# Patient Record
Sex: MALE | Race: WHITE | Employment: FULL TIME | URBAN - METROPOLITAN AREA
[De-identification: names, ages, dates, MRNs, and addresses within clinical notes are randomized per-mention and may not be internally consistent; named-entity substitution may affect disease eponyms.]

---

## 2022-11-30 ENCOUNTER — HOSPITAL ENCOUNTER (EMERGENCY)
Age: 47
Discharge: HOME | End: 2022-11-30
Attending: EMERGENCY MEDICINE
Payer: COMMERCIAL

## 2022-11-30 VITALS
HEART RATE: 98 BPM | RESPIRATION RATE: 20 BRPM | OXYGEN SATURATION: 99 % | WEIGHT: 200 LBS | DIASTOLIC BLOOD PRESSURE: 86 MMHG | SYSTOLIC BLOOD PRESSURE: 170 MMHG

## 2022-11-30 DIAGNOSIS — S63.91XA SPRAIN OF RIGHT HAND, INITIAL ENCOUNTER: Primary | ICD-10-CM

## 2022-11-30 PROCEDURE — 99283 EMERGENCY DEPT VISIT LOW MDM: CPT

## 2022-11-30 ASSESSMENT — ENCOUNTER SYMPTOMS
CHEST TIGHTNESS: 0
WHEEZING: 0
SORE THROAT: 0
DIARRHEA: 0
SHORTNESS OF BREATH: 0
COUGH: 0
VOMITING: 0
ABDOMINAL PAIN: 0
BACK PAIN: 0
NAUSEA: 0

## 2022-11-30 NOTE — ED PROVIDER NOTES
Chief complaint:  Right hand pain    HPI history provided by the patient  Patient comes in by ambulance complaining of an episode of right hand pain today while at work. He apparently was picking a piece of wood out that he typically picks up all the time in the palm aspect of his right hand over the fourth and fifth digits hurt him causing a quick sharp tingling sensation through there and now feels a little swollen although has no particular pain. No blunt injuries. No deformities. No redness. No fevers. No lacerations or puncture wounds. No treatment prior to arrival.  Symptoms improving already. Review of Systems   Constitutional:  Negative for chills, diaphoresis, fatigue and fever. HENT:  Negative for congestion and sore throat. Respiratory:  Negative for cough, chest tightness, shortness of breath and wheezing. Cardiovascular:  Negative for chest pain and palpitations. Gastrointestinal:  Negative for abdominal pain, diarrhea, nausea and vomiting. Genitourinary:  Negative for dysuria and flank pain. Musculoskeletal:  Negative for arthralgias, back pain, gait problem, neck pain and neck stiffness. Skin:  Negative for rash and wound. Neurological:  Negative for weakness, numbness and headaches. All other systems reviewed and are negative. Physical Exam  Vitals and nursing note reviewed. Constitutional:       General: He is awake. He is not in acute distress. Appearance: He is well-developed. He is not ill-appearing, toxic-appearing or diaphoretic. HENT:      Head: Normocephalic and atraumatic. Comments: No sign of acute head or face injuries  Eyes:      Pupils: Pupils are equal, round, and reactive to light. Cardiovascular:      Rate and Rhythm: Normal rate and regular rhythm. Heart sounds: Normal heart sounds. No murmur heard. Pulmonary:      Effort: Pulmonary effort is normal. No respiratory distress. Breath sounds: Normal breath sounds.  No stridor, decreased air movement or transmitted upper airway sounds. No decreased breath sounds, wheezing, rhonchi or rales. Abdominal:      General: Bowel sounds are normal.      Palpations: Abdomen is soft. Tenderness: There is no abdominal tenderness. There is no guarding or rebound. Musculoskeletal:      Cervical back: Full passive range of motion without pain, normal range of motion and neck supple. No signs of trauma or rigidity. Normal range of motion. Comments: Right hand with intact radial, ulnar and median nerves, no swelling, no limited range of motion of the right wrist.  Normal tracking and almost complete full range of motion to flexion of all 5 digits, slightly weak at the end of gripping otherwise good flexion throughout all 5 digits with intact equal extension. He is able to abduct and adduct all 5 digits, able to touch all 5 digits with the thumb, has no tenderness throughout palpation of the flexors of the digits of the right hand, no extensor tendon tenderness on palpation to the right hand. Cap refill less than 2 seconds all 5 digits with sensation grossly and equally intact. Strong radial and ulnar pulses in the right wrist.  The right hand is actually an unremarkable exam with no sign of acute injury at this time. No bony tenderness throughout palpation. Arms legs otherwise are examined, palpated throughout their entirety and have no sign of acute bony or joint injuries. No cervical, thoracic or lumbar spine tenderness. Skin:     General: Skin is warm and dry. Coloration: Skin is not cyanotic, jaundiced, mottled or pale. Neurological:      General: No focal deficit present. Mental Status: He is alert and oriented to person, place, and time. GCS: GCS eye subscore is 4. GCS verbal subscore is 5. GCS motor subscore is 6. Cranial Nerves: No cranial nerve deficit. Coordination: Coordination normal.   Psychiatric:         Behavior: Behavior is cooperative. Procedures     MDM  Patient presents here for right hand pain while working, developed right palmar pain over the fourth and fifth digits when picking up a piece of wood that he picks up daily, as he picked it up he states he had a quick pain and sharp tingly pain into the palm which is improving at this time. Physical exam is unremarkable and reassuring please see above. Work-up considerations for hand sprain, muscle spasm, muscle strain, tendon strain. No indication on history for blunt injury or bony injury or fractures. No historical suggestion of infection. No lacerations. No need for imaging at this time. Based on history and work-up, considerations most likely a strain of the muscles or tendons of the right hand. This is discussed with the patient as well as close outpatient follow-up.              --------------------------------------------- PAST HISTORY ---------------------------------------------  Past Medical History:  has no past medical history on file. Past Surgical History:  has no past surgical history on file. Social History:      Family History: family history is not on file. The patients home medications have been reviewed. Allergies: Keflex [cephalexin] and Penicillin v    -------------------------------------------------- RESULTS -------------------------------------------------  Labs:  No results found for this visit on 11/30/22. Radiology:  No orders to display       ------------------------- NURSING NOTES AND VITALS REVIEWED ---------------------------  Date / Time Roomed:  No admission date for patient encounter. ED Bed Assignment:  Room/bed info not found    The nursing notes within the ED encounter and vital signs as below have been reviewed.    BP (!) 170/86   Pulse 98   Resp 20   Wt 200 lb (90.7 kg)   SpO2 99%   Oxygen Saturation Interpretation: Normal      ------------------------------------------ PROGRESS NOTES ------------------------------------------  I have spoken with the patient and discussed todays results, in addition to providing specific details for the plan of care and counseling regarding the diagnosis and prognosis. Their questions are answered at this time and they are agreeable with the plan. I discussed at length with them reasons for immediate return here for re evaluation. They will followup with primary care by calling their office tomorrow. --------------------------------- ADDITIONAL PROVIDER NOTES ---------------------------------  At this time the patient is without objective evidence of an acute process requiring hospitalization or inpatient management. They have remained hemodynamically stable throughout their entire ED visit and are stable for discharge with outpatient follow-up. The plan has been discussed in detail and they are aware of the specific conditions for emergent return, as well as the importance of follow-up. New Prescriptions    No medications on file       Diagnosis:  1. Sprain of right hand, initial encounter        Disposition:  Patient's disposition: Discharge to home  Patient's condition is stable.          Jerardo Laboy DO  11/30/22 1830

## 2022-12-13 DIAGNOSIS — M79.641 RIGHT HAND PAIN: Primary | ICD-10-CM

## 2022-12-15 ENCOUNTER — OFFICE VISIT (OUTPATIENT)
Dept: ORTHOPEDIC SURGERY | Age: 47
End: 2022-12-15

## 2022-12-15 VITALS — HEIGHT: 70 IN | WEIGHT: 200 LBS | BODY MASS INDEX: 28.63 KG/M2 | TEMPERATURE: 98 F

## 2022-12-15 DIAGNOSIS — S63.91XA HAND SPRAIN, RIGHT, INITIAL ENCOUNTER: Primary | ICD-10-CM

## 2022-12-15 RX ORDER — OMEPRAZOLE 40 MG/1
CAPSULE, DELAYED RELEASE ORAL
COMMUNITY
Start: 2022-12-14

## 2022-12-15 RX ORDER — CELECOXIB 200 MG/1
200 CAPSULE ORAL DAILY
Qty: 30 CAPSULE | Refills: 3 | Status: SHIPPED | OUTPATIENT
Start: 2022-12-15 | End: 2023-04-14

## 2022-12-15 RX ORDER — METHYLPREDNISOLONE 4 MG/1
4 TABLET ORAL SEE ADMIN INSTRUCTIONS
Qty: 1 KIT | Refills: 0 | Status: SHIPPED | OUTPATIENT
Start: 2022-12-15 | End: 2022-12-21

## 2022-12-15 NOTE — PROGRESS NOTES
Chief Complaint   Patient presents with    Hand Pain     Right Hand, injured at work on 11/30/2022 while turing pieces of wood, went to Urgent Care and has seen [facility] twice. States of pain when closing his hand. Mindy Leonard is a 49y.o. year old  male who presents for evaluation of right hand pain. he reports this started 11/30/22 when he was grabbing a piece of wood. he does remember a specific injury that started the pain. The injury was direct trauma. His pain is located mainly in the palm of the hand. The pain is worse with activity and better with rest.  He has been seen by [facility]. He is right hand dominant and the patient is a  at [employer]. He has been on light duty since the injury. No past medical history on file. No past surgical history on file.     Current Outpatient Medications:     omeprazole (PRILOSEC) 40 MG delayed release capsule, , Disp: , Rfl:     celecoxib (CELEBREX) 200 MG capsule, Take 1 capsule by mouth daily, Disp: 30 capsule, Rfl: 3    methylPREDNISolone (MEDROL, LILLIAM,) 4 MG tablet, Take 1 tablet by mouth See Admin Instructions for 6 days Take by mouth., Disp: 1 kit, Rfl: 0  Allergies   Allergen Reactions    Keflex [Cephalexin]     Lactose     Penicillin V      Social History     Socioeconomic History    Marital status:      Spouse name: Not on file    Number of children: Not on file    Years of education: Not on file    Highest education level: Not on file   Occupational History    Not on file   Tobacco Use    Smoking status: Not on file    Smokeless tobacco: Not on file   Substance and Sexual Activity    Alcohol use: Not on file    Drug use: Not on file    Sexual activity: Not on file   Other Topics Concern    Not on file   Social History Narrative    Not on file     Social Determinants of Health     Financial Resource Strain: Not on file   Food Insecurity: Not on file   Transportation Needs: Not on file   Physical Activity: Not on file   Stress: Not on file   Social Connections: Not on file   Intimate Partner Violence: Not on file   Housing Stability: Not on file     No family history on file. REVIEW OF SYSTEMS:     General/Constitution:  (-)weight loss, (-)fever, (-)chills, (-)weakness. Skin: (-) rash,(-) psoriasis,(-) eczema, (-)skin cancer. Musculoskeletal: (-) fractures,  (-) dislocations,(-) collagen vascular disease, (-) fibromyalgia, (-) multiple sclerosis, (-) muscular dystrophy, (-) RSD,(-) joint pain (-)swelling, (-) joint pain,swelling. Neurologic: (-) epilepsy, (-)seizures,(-) brain tumor,(-) TIA, (-)stroke, (-)headaches, (-)Parkinson disease,(-) memory loss, (-) LOC. Cardiovascular: (-) Chest pain, (-) swelling in legs/feet, (-) SOB, (-) cramping in legs/feet with walking. Respiratory: (-) SOB, (-) Coughing, (-) night sweats. GI: (-) nausea, (-) vomiting, (-) diarrhea, (-) blood in stool, (-) gastric ulcer. Psychiatric: (-) Depression, (-) Anxiety, (-) bipolar disease, (-) Alzheimer's Disease  Allergic/Immunologic: (-) allergies latex, (-) allergies metal, (-) skin sensitivity. Hematlogic: (-) anemia, (-) blood transfusion, (-) DVT/PE, (-) Clotting disorders    SUBJECTIVE:    Constitution:    Temp 98 °F (36.7 °C)   Ht 5' 10\" (1.778 m)   Wt 200 lb (90.7 kg)   BMI 28.70 kg/m²     Psycihatric:  The patient is alert and oriented x 3, appears to be stated age and in no distress. Respiratory:  ReSpiratory effort is not labored. Patient is not gasping. Palpation of the chest reveals no tactile fremitus. Skin:  Upon inspection: the skin appears warm, dry and intact. There is not a previous scar over the affected area. There is not any cellulitis, lymphedema or cutaneous lesions noted in the lower extremities. Upon palpation there is no induration noted. Neurologic:    Gait: normal;  Motor exam of the upper extremities show: The reflexes in biceps/triceps/brachioradialis are equal and symmetric.   Sensory exam C5-T1 are normal bilaterally. Cardiovascular: The vascular exam is normal and is well perfused to distal extremities. There are 2+ radial pulses bilaterally, and motor and sensation is intact to median, ulnar, and radial, musclocutaneus, and axillary nerve distribution and grossly symmetric bilaterally. There is cap refill noted less than two seconds in all digits. There is not edema of the bilateral lower extremities. There is not varicosities noted in the distal extremities. Lymph:  Upon palpation,  there is no lymphadenopathy noted in bilateral lower extremities. Musculoskeletal:  Gait: normal; examination of the nails and digits reveal no cyanosis or clubbing. Cervical Exam:  On physical exam, Irene Koehler is well-developed, well-nourished, oriented to person, place and time. his gait is normal.  On evaluation of his cervical spine, he has full range of motion of the cervical spine without pain. There is no cervical tenderness to palpation. Shoulder Exam:  On evaluation of his bilaterally upper extremities, his bilateral shoulder has no deformity. There is not evidence of scapular dyskinesis. There is not muscle atrophy in shoulder girdle. The range of motion for the Right Shoulder is 160/45/T8 and for the Left shoulder is 160/45/T8. Right shoulder Motor strength is 5/5 in the supraspinatus, 5/5 internal rotation and 5/5 in external rotation, and Left shoulder motor strength 5/5 in supraspinatus, 5/5 in internal rotation, 5/5 in external rotation. Elbow exam:  Evaluation of the elbow, reveals no signs of swelling or deformity. ROM is 0-150. There is not instability with varus/valgus stresses. Motor strength is 5/5 with flexion/extension. Wrist exam:  Inspection of the bilateral upper extremities, there is no evidence of deformity of the wrist.  ROM Wrist ROM R wrist DF 70, VF 80, L wrist DF 70, VF 80, R pronation 90/ supination 90, L pronation 90/supination 90. Motor strength is 5/5 with Dorsiflexion/Volarflexion/Supination/Pronation. Motor and sensation is intact and symmetric throughout the bilateral upper extremities in the median, ulnar and radial , musclcutaneous, and axillary nerve distributions. Hand exam:  The skin overlying the hand is  intact. There is not evidence of scar, lesion, laceration, or abrasion. The motion in the small joints of the hand are intact with no stiffness or deformity. The ROM in the MCP flexion WNL/ extension WNL , PIP flexion WNL/ extension WNL, DIP flexion WNL/ extension WNL. There is not rotational deformity. There is no masses or adenopathy in bilateral upper extremities. Radial pulses are 2+ and symmetric bilaterally. Capillary refill is intact and < 2 seconds. Motor strength is 5/5 with flexion and extension of the small finger joints. Right:  Phallens sign negative, Tinnells sign negative, Median nerve compression test negative ,  Finklesteins negative, CMC Grind test negative, Piano Key Test negative. Left:  Phallens sign negative, Tinnells sign negative, Median nerve compression test negative ,  Finklesteins negative, CMC Grind test negative, Piano Key Test negative. Xrays:   Normal findings. Radiographic findings reviewed with patient    Impression:   Encounter Diagnosis   Name Primary? Hand sprain, right, initial encounter Yes       Plan: Natural history and expected course discussed. Questions answered. I suggest a C9 for a medrol dose pack and celebrex 200mg as well as occupational therapy. I will see him back in 2-3 weeks. He will stay on light duty until then. At least 30 minutes was spent discussing the diagnosis and treatment options with the patient with at least 50% of the time was spent with decision making and counseling the patient.

## 2023-01-04 ENCOUNTER — EVALUATION (OUTPATIENT)
Dept: OCCUPATIONAL THERAPY | Age: 48
End: 2023-01-04
Payer: COMMERCIAL

## 2023-01-04 DIAGNOSIS — S63.91XA SPRAIN OF UNSPECIFIED PART OF RIGHT WRIST AND HAND, INITIAL ENCOUNTER: Primary | ICD-10-CM

## 2023-01-04 PROCEDURE — 97530 THERAPEUTIC ACTIVITIES: CPT | Performed by: OCCUPATIONAL THERAPIST

## 2023-01-04 PROCEDURE — 97165 OT EVAL LOW COMPLEX 30 MIN: CPT | Performed by: OCCUPATIONAL THERAPIST

## 2023-01-04 NOTE — PROGRESS NOTES
OCCUPATIONAL THERAPY INITIAL EVALUATION    [address]  [facility]   [place] Garrido [address] [place] [address]  Dept: [phone]  Loc: [phone]   Jenna Rivera OT Fax: [phone]    Date:  2023  Initial Evaluation Date: 23     Evaluating Therapist: Hans Sagastume OT/L  [id number]    Patient Name:  Skylar Hamm    :  1975    Restrictions/Precautions:   Activity as tolerated, Low fall risk  Diagnosis:  S63.91XA (ICD-10-CM) - Sprain of unspecified part of right wrist and hand, initial encounter    Date of Surgery/Injury: DOI [id number]    Insurance/Certification information:  [employer]/ CL [id number]  Plan of care signed (Y/N): N  Visit# / total visits:  approved visits thru C9 23    Referring Practitioner:  Dr Chino Castañeda Practitioner Orders: OT evaluate and treat    Assessment of current deficits   [] Functional mobility  [] ADLs  [x] Strength   [] Cognition   [] Functional transfers   [] IADLs  [] Safety Awareness  [] Endurance   [] Fine Motor Coordination  [] Balance  [] Vision/perception  [] Sensation    [] Gross Motor Coordination [] ROM  [x] Pain   [x] Edema    [] Scar Adhesion/Skin Integrity     OT PLAN OF CARE   OT POC based on physician orders, patient diagnosis and results of clinical assessment    Frequency/Duration: 2-3 x/ week x 4 weeks  Specific OT Treatment to include:     [x] Instruction in HEP                   Modalities:  [x] Therapeutic Exercise        [x] Ultrasound               [] Electrical Stimulation/Attended  [x] PROM/Stretching                    [] Fluidotherapy          [x]  Paraffin                   [x] AAROM  [] AROM                 [] Iontophoresis:   [x] Tendon Glides                                               [] Neuromuscular Re-Ed            [] ADL/IADL re-training    [] Therapeutic Activity       [x] Pain Management with/without modalities PRN                 [x] Manual Therapy                      [] Splinting                      [x] Scar Management                   []Joint Protection/Training  []Ergonomics                             [] Joint Mobilization        [] Adaptive Equipment Assessment/Training                             [x] Manual Edema Mobilization   [] Myofascial Release                 [] Energy Conservation/Work Simplification  [] GM/FM Coordination       [] Safety retraining/education per  individual diagnosis/goals  [] Desensitization       Patient Specific Goal: Get back to normal                             GOALS (Long term same as Short term):  1) Patient will demonstrate good understanding of home program (exercises/activities/diagnosis/prognosis/goals) with good accuracy. 2) Patient will demonstrate increased /pinch strength of at least 10  of their R hand. 3) Patient to report decreased pain in their affected R hand/ upper extremity from 0/10 with light activity  to 2/10 or less with moderate resistive functional use. 4) Patient will return to work same job with no restrictions using the R hand effectively. 5) Patient will demonstrate improved functional activity tolerance from good with light activity to good with moderate to heavy activity for transition into work functions completion. Past Medical History: No past medical history on file. Past Surgical History: No past surgical history on file. Reason for Referral: Pt presents with a Hx of sudden onset R hand pain while picking up large pieces of wood at work. (DOI 11-30-22). Pain was mainly localized to the palm of the hand and into the ring finger. Xrays were normal and pt was diagnosed with a R hand / wrist sprain. Treatment included medication and referral to OT. Pt feels the steroid helped. His main concern at this point is improving so he can use his hand better for work. Pt cc: protective use of the R hand.  Palpation reveals tightness and mild swelling in the palm along the flexor for the ring finger. Home Living:   Prior Level of Function: Independent    Cognition:   Alert/Oriented x3     IADL STATUS:   Ind Mod I Min A Mod A Max A Dep Other   Homemaking Responsibility  x     Avoids wood working, car maintenance   Shopping Responsibility: x         Mode of Transportation: car x         Leisure & Hobbies:          [place] [employer]  x     On light duty     Comments:Pt is doing well with light chores around the home including dishes, taking out the trash. Doing OK with meal prep and opening containers. However, he has yet to resume moderate to heavy activity in fear if hurting the hand again. ADL STATUS:   Ind Mod I Min A Mod A Max A Dep Other   Feeding: x         Grooming: x         Bathing: x         UE Dressing: x         LE Dressing: x         Toileting: x         Transfers: x           Comments:    Pain Level: No pain reported with light activity/ ? With moderate to heavy activity    UE Assessment: RHD    R wrist/ hand AROM: WNL but some tightness is present with tendon glides. Sensation: WNL for the R hand    Dynamometer (setting 2):     Left: 71#      Right: 60# with pulling in the ring finger      Pinch Meter:   Lateral: Left= 20#,Right= 17#    Palmar 3 point: Left= 17.5#, Right= 18.5#    Coordination: WNL    Quick DASH Score: 8% disability    Intervention: Tx started with a focus on tendon glides to enhance mobility in the palm and light hand strengthening. Pt is to start with xsoft putty and ^ to soft putty as he can tolerate. Exercises include gripping at 50% effort, and  rolling over the palm to enhance tendon stretches. Pt reports mild \" weird\" feeling in the palm after the new exercises. Pt is reminded to monitor how he feels and to advance slowly while avoiding pain. Will continue.  Therapeutic activity- 15 min    Eval Complexity: Low  Profile and History- Interview, MD notes  Assessment of Occupational Performance and Identification of Deficits- 3 performance deficits   Clinical Decision Making- no additional modifications required    Rehab Potential:                                 [x] Good  [] Fair  [] Poor        Suggested Professional Referral:       [x] No  [] Yes:  Barriers to Goal Achievement[de-identified]          [x] No  [] Yes:  Domestic Concerns:                           [x] No  [] Yes:       Patient. Education:  [] Plans/Goals, Risks/Benefits discussed  [] Home exercise program  Method of Education: [] Verbal  [] Demo  [] Written  Comprehension of Education:  [] Verbalizes understanding. [] Demonstrates understanding. [] Needs Review. [] Demonstrates/verbalizes understanding of HEP/Ed previously given. Patient understands diagnosis/prognosis and consents to treatment, plan and goals: [x] Yes    [] No     Time In: 0810            Time Out: 0900                      Timed Code Treatment Minutes: 50 minutes      CODE  Minutes  Units   19783 OT Eval Low 35 1   43740 OT Eval Medium     86955 OT Eval High     35183 Fluidotherapy     47057 Manual     36952 Therapeutic Ex     91490 Therapeutic Activity 15 1   66982 ADL/COMP Tech Train     22147 Neuromuscular Re-Ed     20888 OrthoManagementTraining     47433 Paraffin     70039 Electrical Stim - Attended     [id number] Iontophoresis     03144 Ultrasound      Other                Electronically signed by: Festus Moraes OT /GOLDIE  [id number]     [id number] Certification / Comments      Frequency/Duration 2-3x / week for up to 12 visits. Certification period From: 1-4-23  To: 4-4-23     I have reviewed the Plan of Care established for skilled therapy services and certify that the services are required and that they will be provided while the patient is under my care.      Physician's Comments/Revisions:           Physicians's Printed Name:  Dr Nithya Navarro                                   Physician's Signature:                                                               Date:      Please review Patient's OT evaluation and if you agree sign/date and fax back to us at our [facility] OT Fax: [phone].  Thank you for your referral!

## 2023-01-05 PROBLEM — S63.91XA SPRAIN OF UNSPECIFIED PART OF RIGHT WRIST AND HAND, INITIAL ENCOUNTER: Status: ACTIVE | Noted: 2023-01-05

## 2023-01-06 ENCOUNTER — TREATMENT (OUTPATIENT)
Dept: OCCUPATIONAL THERAPY | Age: 48
End: 2023-01-06

## 2023-01-06 DIAGNOSIS — S63.91XA SPRAIN OF UNSPECIFIED PART OF RIGHT WRIST AND HAND, INITIAL ENCOUNTER: Primary | ICD-10-CM

## 2023-01-06 NOTE — PROGRESS NOTES
[facility] [address] [place] [id number]  Dept: [address] [place] [address] [address]: [address] OT Fax: [phone]    OCCUPATIONAL THERAPY DAILY NOTE    Date:  2023  Initial Evaluation Date: 23    Patient Name:  Varsha Do    :  1975    Restrictions/Precautions:   Activity as tolerated, Low fall risk  Diagnosis:  S63.91XA (ICD-10-CM) - Sprain of unspecified part of right wrist and hand, initial encounter                        Date of Surgery/Injury: DOI 56-15-49     Insurance/Certification information:  [employer]/ CL [id number]  Plan of care signed (Y/N): N  Visit# / total visits:  approved visits thru C9 23     Referring Practitioner:  Dr Zia Segura Practitioner Orders: OT evaluate and treat     Assessment of current deficits   [] Functional mobility             [] ADLs           [x] Strength                  [] Cognition   [] Functional transfers           [] IADLs          [] Safety Awareness  [] Endurance   [] Fine Motor Coordination    [] Balance      [] Vision/perception    [] Sensation     [] Gross Motor Coordination [] ROM           [x] Pain                        [x] Edema          [] Scar Adhesion/Skin Integrity      OT PLAN OF CARE   OT POC based on physician orders, patient diagnosis and results of clinical assessment     Frequency/Duration: 2-3 x/ week x 4 weeks  Specific OT Treatment to include:      [x] Instruction in HEP                   Modalities:  [x] Therapeutic Exercise                 [x] Ultrasound               [] Electrical Stimulation/Attended  [x] PROM/Stretching                    [] Fluidotherapy          [x]  Paraffin                   [x] AAROM  [] AROM                 [] Iontophoresis:   [x] Tendon Glides                                               [] Neuromuscular Re-Ed            [] ADL/IADL re-training    [] Therapeutic Activity                  [x] Pain Management with/without modalities PRN                 [x] Manual Therapy                      [] Splinting                                   [x] Scar Management                   []Joint Protection/Training  []Ergonomics                             [] Joint Mobilization                      [] Adaptive Equipment Assessment/Training                             [x] Manual Edema Mobilization   [] Myofascial Release                 [] Energy Conservation/Work Simplification  [] GM/FM Coordination                [] Safety retraining/education per  individual diagnosis/goals  [] Desensitization        Patient Specific Goal: Get back to normal                             GOALS (Long term same as Short term):  1) Patient will demonstrate good understanding of home program (exercises/activities/diagnosis/prognosis/goals) with good accuracy. 2) Patient will demonstrate increased /pinch strength of at least 10  of their R hand. 3) Patient to report decreased pain in their affected R hand/ upper extremity from 0/10 with light activity  to 2/10 or less with moderate resistive functional use. 4) Patient will return to work same job with no restrictions using the R hand effectively. 5) Patient will demonstrate improved functional activity tolerance from good with light activity to good with moderate to heavy activity for transition into work functions completion. Pain Level: No pain at [facility] start    Subjective: Pt presents with no new complaints. Objective:  Updated POC to be completed by 2-4-23.     INTERVENTION: COMPLETED: SPECIFICS/COMMENTS:   Modality:     Paraffin Tx x         AROM:     Hand AROM X  X  X  x - isolated MCP flex/ ext  - Fingertip curls 10x  - towel ex 10x  - full hand hyperextension at MCPs as tolerated        AAROM:               PROM/Stretching:     Prayer stretch x         Scar Mass/Edema Control:     Soft tissue mob x - palmar hand        Strengthening:     Hand strengthening X    x - 3# red and 5# green digiflex composite 20x and each digit 10x  - putty ex xsoft- gripping x 20 min, in hand manipulation with 7 coins, digit ABD 5x, digit ADD 5x  -Velcro board        Other:     HEP x - tendon glides, putty ex          Assessment/Comments: Pt is making Good progress toward stated plan of care. Tx completed with a focus on ROM and light hand strengthening. No ^ in pain  . Will continue to advance as tolerated    -Rehab Potential: Good  -Requires OT Follow Up: Yes  Time In:1400            Time Out: 1500             Visit #: 2    Treatment Charges: Mins Time in - Time out  Units   Modalities: paraffin 10 1053-1147 1   Ther Exercise 20 5920-9269 1   Manual Therapy 15 1382-2927 1   Thera Activities 15 1445- 1500 1   ADL/Home Mgt       Neuro Re-education      Gait Training      Group Therapy      Non-Billable Service Time      Other      Total Time/Units 60  4        -Response to Treatment: Pt is doing we ll with activity so far. Goals: Goals for pt can be seen on initial eval occurring on 1-4-23    Plan:   [x]  Continue Plan of care: Treatment covered based on POC and graduated to patient's progress. Pt education continues at each visit to obtain maximum benefits from skilled OT intervention.   []  Alter Plan of care:   []  Discharge:      María Rudd OT /GOLDIE  [id number]

## 2023-01-10 ENCOUNTER — TREATMENT (OUTPATIENT)
Dept: OCCUPATIONAL THERAPY | Age: 48
End: 2023-01-10
Payer: COMMERCIAL

## 2023-01-10 DIAGNOSIS — S63.91XA SPRAIN OF UNSPECIFIED PART OF RIGHT WRIST AND HAND, INITIAL ENCOUNTER: Primary | ICD-10-CM

## 2023-01-10 PROCEDURE — 97110 THERAPEUTIC EXERCISES: CPT | Performed by: OCCUPATIONAL THERAPIST

## 2023-01-10 PROCEDURE — 97018 PARAFFIN BATH THERAPY: CPT | Performed by: OCCUPATIONAL THERAPIST

## 2023-01-10 PROCEDURE — 97530 THERAPEUTIC ACTIVITIES: CPT | Performed by: OCCUPATIONAL THERAPIST

## 2023-01-10 PROCEDURE — 97140 MANUAL THERAPY 1/> REGIONS: CPT | Performed by: OCCUPATIONAL THERAPIST

## 2023-01-10 NOTE — PROGRESS NOTES
[facility] [address] [place] [id number]  Dept: [address] [place] [address] [address]: [address] OT Fax: [phone]    OCCUPATIONAL THERAPY DAILY NOTE    Date:  1/10/2023  Initial Evaluation Date: 23    Patient Name:  Valeria Stanley    :  1975    Restrictions/Precautions:   Activity as tolerated, Low fall risk  Diagnosis:  S63.91XA (ICD-10-CM) - Sprain of unspecified part of right wrist and hand, initial encounter                        Date of Surgery/Injury: DOI      Insurance/Certification information:  [employer]/ CL [id number]  Plan of care signed (Y/N): N  Visit# / total visits: 3 / 12 approved visits thru C9 23     Referring Practitioner:  Dr Lalito Brandt Practitioner Orders: OT evaluate and treat     Assessment of current deficits   [] Functional mobility             [] ADLs           [x] Strength                  [] Cognition   [] Functional transfers           [] IADLs          [] Safety Awareness  [] Endurance   [] Fine Motor Coordination    [] Balance      [] Vision/perception    [] Sensation     [] Gross Motor Coordination [] ROM           [x] Pain                        [x] Edema          [] Scar Adhesion/Skin Integrity      OT PLAN OF CARE   OT POC based on physician orders, patient diagnosis and results of clinical assessment     Frequency/Duration: 2-3 x/ week x 4 weeks  Specific OT Treatment to include:      [x] Instruction in HEP                   Modalities:  [x] Therapeutic Exercise                 [x] Ultrasound               [] Electrical Stimulation/Attended  [x] PROM/Stretching                    [] Fluidotherapy          [x]  Paraffin                   [x] AAROM  [] AROM                 [] Iontophoresis:   [x] Tendon Glides                                               [] Neuromuscular Re-Ed            [] ADL/IADL re-training    [] Therapeutic Activity                  [x] Pain Management with/without modalities PRN                 [x] Manual Therapy                      [] Splinting                                   [x] Scar Management                   []Joint Protection/Training  []Ergonomics                             [] Joint Mobilization                      [] Adaptive Equipment Assessment/Training                             [x] Manual Edema Mobilization   [] Myofascial Release                 [] Energy Conservation/Work Simplification  [] GM/FM Coordination                [] Safety retraining/education per  individual diagnosis/goals  [] Desensitization        Patient Specific Goal: Get back to normal                             GOALS (Long term same as Short term):  1) Patient will demonstrate good understanding of home program (exercises/activities/diagnosis/prognosis/goals) with good accuracy. 2) Patient will demonstrate increased /pinch strength of at least 10  of their R hand. 3) Patient to report decreased pain in their affected R hand/ upper extremity from 0/10 with light activity  to 2/10 or less with moderate resistive functional use. 4) Patient will return to work same job with no restrictions using the R hand effectively. 5) Patient will demonstrate improved functional activity tolerance from good with light activity to good with moderate to heavy activity for transition into work functions completion. Pain Level: No pain at [facility] start    Subjective: Pt presents with no new complaints. Objective:  Updated POC to be completed by 2-4-23.     INTERVENTION: COMPLETED: SPECIFICS/COMMENTS:   Modality:     Paraffin Tx x         AROM:     Hand AROM X  X    x - isolated MCP flex/ ext  - Fingertip curls 10x  - towel ex 10x  - isolated digital hyperextension at MCPs as tolerated        AAROM:               PROM/Stretching:     Prayer stretch x         Scar Mass/Edema Control:     Soft tissue mob x - palmar hand        Strengthening:     Hand strengthening X          X      X    x - 5# green  and 7# blue digiflex composite 10x and each digit 10x  - putty ex xsoft- gripping x 20 min, in hand manipulation with 7 coins, digit ABD 5x, digit ADD 5x  -Velcro board- tabs with middle / ring fingers, roll with handle for wrist, roll with digital flexion/ extension  - hand gripper / dyn (rung 3- 20x,  and hold 5 sec 5x)  - lift weighted ball with palmar grasp 15x        Other:     HEP x - tendon glides, putty ex          Assessment/Comments: Pt is making Good progress toward stated plan of care. ROM is WNL with less tightness in the palm. Hand strengthening is ^ with good tolerance. Pt reports  muscle strain in the forearm with activity but denies any ^ in pain.     -Rehab Potential: Good  -Requires OT Follow Up: Yes  Time In:1400            Time Out: 2011           Visit #: 3    Treatment Charges: Mins Time in - Time out  Units   Modalities: paraffin 10 6874-3144 1   Ther Exercise 20 8436-0237 1   Manual Therapy 10 6937-8788 1   Thera Activities 15 1440- 1555 1   ADL/Home Mgt       Neuro Re-education      Gait Training      Group Therapy      Non-Billable Service Time      Other      Total Time/Units 60  4        -Response to Treatment: Pt is doing well with activity so far. Goals: Goals for pt can be seen on initial eval occurring on 1-4-23    Plan:   [x]  Continue Plan of care: Advance strengthening as tolerated. Treatment covered based on POC and graduated to patient's progress. Pt education continues at each visit to obtain maximum benefits from skilled OT intervention.   []  Alter Plan of care:   []  Discharge:      Martita Gastelum OT /GOLDIE  [id number]

## 2023-01-11 ENCOUNTER — TREATMENT (OUTPATIENT)
Dept: OCCUPATIONAL THERAPY | Age: 48
End: 2023-01-11
Payer: COMMERCIAL

## 2023-01-11 DIAGNOSIS — S63.91XA SPRAIN OF UNSPECIFIED PART OF RIGHT WRIST AND HAND, INITIAL ENCOUNTER: Primary | ICD-10-CM

## 2023-01-11 PROCEDURE — 97018 PARAFFIN BATH THERAPY: CPT | Performed by: OCCUPATIONAL THERAPIST

## 2023-01-11 PROCEDURE — 97110 THERAPEUTIC EXERCISES: CPT | Performed by: OCCUPATIONAL THERAPIST

## 2023-01-11 PROCEDURE — 97530 THERAPEUTIC ACTIVITIES: CPT | Performed by: OCCUPATIONAL THERAPIST

## 2023-01-11 PROCEDURE — 97140 MANUAL THERAPY 1/> REGIONS: CPT | Performed by: OCCUPATIONAL THERAPIST

## 2023-01-11 NOTE — PROGRESS NOTES
[facility] [address] [place] [id number]  Dept: [address] [place] [address] [address]: [address] OT Fax: [phone]    OCCUPATIONAL THERAPY DAILY NOTE    Date:  2023  Initial Evaluation Date: 23    Patient Name:  Enedelia Husain    :  1975    Restrictions/Precautions:   Activity as tolerated, Low fall risk  Diagnosis:  S63.91XA (ICD-10-CM) - Sprain of unspecified part of right wrist and hand, initial encounter                        Date of Surgery/Injury: DOI      Insurance/Certification information:  [employer]/ CL [id number]  Plan of care signed (Y/N): N  Visit# / total visits:  approved visits thru C9 23     Referring Practitioner:  Dr Penny Solomon Practitioner Orders: OT evaluate and treat     Assessment of current deficits   [] Functional mobility             [] ADLs           [x] Strength                  [] Cognition   [] Functional transfers           [] IADLs          [] Safety Awareness  [] Endurance   [] Fine Motor Coordination    [] Balance      [] Vision/perception    [] Sensation     [] Gross Motor Coordination [] ROM           [x] Pain                        [x] Edema          [] Scar Adhesion/Skin Integrity      OT PLAN OF CARE   OT POC based on physician orders, patient diagnosis and results of clinical assessment     Frequency/Duration: 2-3 x/ week x 4 weeks  Specific OT Treatment to include:      [x] Instruction in HEP                   Modalities:  [x] Therapeutic Exercise                 [x] Ultrasound               [] Electrical Stimulation/Attended  [x] PROM/Stretching                    [] Fluidotherapy          [x]  Paraffin                   [x] AAROM  [] AROM                 [] Iontophoresis:   [x] Tendon Glides                                               [] Neuromuscular Re-Ed            [] ADL/IADL re-training    [] Therapeutic Activity                  [x] Pain Management with/without modalities PRN                 [x] Manual Therapy                      [] Splinting                                   [x] Scar Management                   []Joint Protection/Training  []Ergonomics                             [] Joint Mobilization                      [] Adaptive Equipment Assessment/Training                             [x] Manual Edema Mobilization   [] Myofascial Release                 [] Energy Conservation/Work Simplification  [] GM/FM Coordination                [] Safety retraining/education per  individual diagnosis/goals  [] Desensitization        Patient Specific Goal: Get back to normal                             GOALS (Long term same as Short term):  1) Patient will demonstrate good understanding of home program (exercises/activities/diagnosis/prognosis/goals) with good accuracy. 2) Patient will demonstrate increased /pinch strength of at least 10  of their R hand. 3) Patient to report decreased pain in their affected R hand/ upper extremity from 0/10 with light activity  to 2/10 or less with moderate resistive functional use. 4) Patient will return to work same job with no restrictions using the R hand effectively. 5) Patient will demonstrate improved functional activity tolerance from good with light activity to good with moderate to heavy activity for transition into work functions completion. Pain Level: No pain reported    Subjective: Pt presents with no new complaints following yesterday's visit. Objective:  Updated POC to be completed by 2-4-23.     INTERVENTION: COMPLETED: SPECIFICS/COMMENTS:   Modality:     Paraffin Tx x         AROM:     Hand AROM       x - isolated MCP flex/ ext  - Fingertip curls 10x  - towel ex 10x  - isolated digital hyperextension at MCPs as tolerated        AAROM:               PROM/Stretching:     Prayer stretch x         Scar Mass/Edema Control:     Soft tissue mob x - palmar hand        Strengthening:     Hand strengthening x      x                x - 5# green  and 7# blue digiflex composite 10x and each digit 10x  - putty ex med soft- gripping x 20 min, fingertip curl and pull 15x, finger tip pull with fingers in extension   - putty ex soft/ finger tip drag index/ middle/ ring/ small  fingers 4x  -Velcro board- tabs with middle / ring fingers, roll with handle for wrist, roll with digital flexion/ extension  - hand gripper / dyn (rung 3- 20x,  and hold 5 sec 5x)  - lift weighted ball with palmar grasp 15x  -green dayan twist in 20x/ out 20x/ bending 20x        Other:     HEP x - tendon glides, putty ex          Assessment/Comments: Pt is making Good progress toward stated plan of care. Pt exhibits full AROM. Tolerance for resistive gripping and activity is good with pain reported. At this point, pt is considering RTW. Therapist is in agreement. MD follow up is scheduled for tomorrow.     -Rehab Potential: Good  -Requires OT Follow Up: Yes  Time In:1400            Time Out: 9302           Visit #: 4    Treatment Charges: Mins Time in - Time out  Units   Modalities: paraffin 10 7452-4899 1   Ther Exercise 20 1785-6874 1   Manual Therapy 10 0840-5423 1   Thera Activities 15 1440- 1555 1   ADL/Home Mgt       Neuro Re-education      Gait Training      Group Therapy      Non-Billable Service Time      Other      Total Time/Units 60  4        -Response to Treatment: Pt is doing well with activity so far. Goals: Goals for pt can be seen on initial eval occurring on 1-4-23    Plan:   []  Continue Plan of care: Advance strengthening as tolerated. Treatment covered based on POC and graduated to patient's progress. Pt education continues at each visit to obtain maximum benefits from skilled OT intervention. []  Alter Plan of care:   [x]  Discharge: Pending MD follow up recommendations.       Pauline Barriga, OT /L  [id number]

## 2023-01-12 ENCOUNTER — OFFICE VISIT (OUTPATIENT)
Dept: ORTHOPEDIC SURGERY | Age: 48
End: 2023-01-12
Payer: COMMERCIAL

## 2023-01-12 VITALS — WEIGHT: 200 LBS | BODY MASS INDEX: 28.63 KG/M2 | TEMPERATURE: 98 F | HEIGHT: 70 IN

## 2023-01-12 DIAGNOSIS — S63.91XA HAND SPRAIN, RIGHT, INITIAL ENCOUNTER: Primary | ICD-10-CM

## 2023-01-12 PROCEDURE — 99213 OFFICE O/P EST LOW 20 MIN: CPT | Performed by: ORTHOPAEDIC SURGERY

## 2023-01-12 NOTE — PROGRESS NOTES
Chief Complaint   Patient presents with    Hand Pain     Right Hand [facility] F/U DOI 11/30/2022         Josse Hodges is a 49y.o. year old  who presents for follow up of his WC right hand pain. Patient is doing well and has no issues. No past medical history on file. No past surgical history on file. Current Outpatient Medications:     omeprazole (PRILOSEC) 40 MG delayed release capsule, , Disp: , Rfl:     celecoxib (CELEBREX) 200 MG capsule, Take 1 capsule by mouth daily, Disp: 30 capsule, Rfl: 3  Allergies   Allergen Reactions    Keflex [Cephalexin]     Lactose     Penicillin V      Social History     Socioeconomic History    Marital status:      Spouse name: Not on file    Number of children: Not on file    Years of education: Not on file    Highest education level: Not on file   Occupational History    Not on file   Tobacco Use    Smoking status: Not on file    Smokeless tobacco: Not on file   Substance and Sexual Activity    Alcohol use: Not on file    Drug use: Not on file    Sexual activity: Not on file   Other Topics Concern    Not on file   Social History Narrative    Not on file     Social Determinants of Health     Financial Resource Strain: Not on file   Food Insecurity: Not on file   Transportation Needs: Not on file   Physical Activity: Not on file   Stress: Not on file   Social Connections: Not on file   Intimate Partner Violence: Not on file   Housing Stability: Not on file     No family history on file. Skin: (-) rash,(-) psoriasis,(-) eczema, (-)skin cancer. Musculoskeletal: (-) fractures,  (-) dislocations,(-) collagen vascular disease, (-) fibromyalgia, (-) multiple sclerosis, (-) muscular dystrophy, (-) RSD,(-) joint pain (-)swelling, (-) joint pain,swelling. Neurologic: (-) epilepsy, (-)seizures,(-) brain tumor,(-) TIA, (-)stroke, (-)headaches, (-)Parkinson disease,(-) memory loss, (-) LOC.   Cardiovascular: (-) Chest pain, (-) swelling in legs/feet, (-) SOB, (-) cramping in legs/feet with walking. SUBJECTIVE:      Constitutional:    Temp 98 °F (36.7 °C)   Ht 5' 10\" (1.778 m)   Wt 200 lb (90.7 kg)   BMI 28.70 kg/m²       Skin:    Upon inspection: the skin appears warm, dry and intact. There is not a previous scar over the affected area. There is not any cellulitis, lymphedema or cutaneous lesions noted in the lower extremities. Upon palpation there is no induration noted. Neurologic:    Gait: normal;  Motor exam of the upper extremities show: The reflexes in biceps/triceps/brachioradialis are equal and symmetric. Sensory exam C5-T1 are normal bilaterally. Cardiovascular: The vascular exam is normal and is well perfused to distal extremities. There are 2+ radial pulses bilaterally, and motor and sensation is intact to median, ulnar, and radial, musclocutaneus, and axillary nerve distribution and grossly symmetric bilaterally. There is cap refill noted less than two seconds in all digits. There is not edema of the bilateral upper extremities. There is not varicosities noted in the distal extremities. Lymph:    Upon palpation,  there is no lymphadenopathy noted in bilateral upper extremities. Musculoskeletal:    Cervical Exam:    On physical exam, Gabbie Viera is well-developed, well-nourished, oriented to person, place and time. his gait is normal.  On evaluation of his cervical spine, he has full range of motion of the cervical spine without pain. There is no cervical tenderness to palpation. Shoulder Exam:    On evaluation of his bilaterally upper extremities, his bilateral shoulder has no deformity. There is not evidence of scapular dyskinesis. There is not muscle atrophy in shoulder girdle. The range of motion for the Right Shoulder is 160/45/T8 and for the Left shoulder is 160/45/T8. Right shoulder Motor strength is 5/5 in the supraspinatus, 5/5 internal rotation and 5/5 in external rotation, and Left shoulder motor strength 5/5 in supraspinatus, 5/5 in internal rotation, 5/5 in external rotation. Elbow exam:    Evaluation of the elbow, reveals no signs of swelling or deformity. ROM is 0-150. There is not instability with varus/valgus stresses. Motor strength is 5/5 with flexion/extension. Wrist exam:       Inspection of the bilateral upper extremities, there is no evidence of deformity of the wrist.  ROM Wrist ROM R wrist DF 70, VF 80, L wrist DF 70, VF 80, R pronation 90/ supination 90, L pronation 90/supination 90. Motor strength is 5/5 with Dorsiflexion/Volarflexion/Supination/Pronation. Motor and sensation is intact and symmetric throughout the bilateral upper extremities in the median, ulnar and radial , musclcutaneous, and axillary nerve distributions. Hand exam:    The skin overlying the hand is  intact. There is not evidence of scar, lesion, laceration, or abrasion. The motion in the small joints of the hand are intact with no stiffness or deformity. The ROM in the MCP flexion WNL/ extension WNL , PIP flexion WNL/ extension WNL, DIP flexion WNL/ extension WNL. There is not rotational deformity. There is no masses or adenopathy in bilateral upper extremities. Radial pulses are 2+ and symmetric bilaterally. Capillary refill is intact and < 2 seconds. Motor strength is 5/5 with flexion and extension of the small finger joints. Phallens sign(-), Tinnells sign (-), Median nerve compression test (-),  Finklesteins (-), [employer] Grind test (-), [employer](-). Xrays: not performed today. Radiographic findings reviewed with patient      Impression:   Encounter Diagnosis   Name Primary? Hand sprain, right, initial encounter Yes       Plan: Natural history and expected course discussed. Questions answered. Educational materials distributed. Rest, ice, compression, and elevation (RICE) therapy. Reduction in offending activity discussed.   He juan D/C occupational therapy and return to all activites as tolerated. Return to work full duty 1/16/23.   Follow up as needed